# Patient Record
Sex: MALE
[De-identification: names, ages, dates, MRNs, and addresses within clinical notes are randomized per-mention and may not be internally consistent; named-entity substitution may affect disease eponyms.]

---

## 2018-08-30 ENCOUNTER — HOSPITAL ENCOUNTER (EMERGENCY)
Dept: HOSPITAL 80 - CED | Age: 63
Discharge: HOME | End: 2018-08-30
Payer: SELF-PAY

## 2018-08-30 VITALS — DIASTOLIC BLOOD PRESSURE: 93 MMHG | SYSTOLIC BLOOD PRESSURE: 125 MMHG

## 2018-08-30 DIAGNOSIS — M94.0: ICD-10-CM

## 2018-08-30 DIAGNOSIS — R07.89: Primary | ICD-10-CM

## 2018-08-30 DIAGNOSIS — Z87.891: ICD-10-CM

## 2018-08-30 NOTE — CPEKG
Test Reason : OPEN

Blood Pressure : ***/*** mmHG

Vent. Rate : 097 BPM     Atrial Rate : 097 BPM

   P-R Int : 146 ms          QRS Dur : 082 ms

    QT Int : 333 ms       P-R-T Axes : [phone] degrees

   QTc Int : 423 ms

 

Sinus tachycardia

Atrial premature complex

Probable anteroseptal infarct, old

 

Confirmed by Cassandra Rushing ([id number]) on 8/30/2018 4:31:29 AM

 

Referred By:             Confirmed By:Cassandra Rushing